# Patient Record
Sex: MALE | Race: WHITE | URBAN - METROPOLITAN AREA
[De-identification: names, ages, dates, MRNs, and addresses within clinical notes are randomized per-mention and may not be internally consistent; named-entity substitution may affect disease eponyms.]

---

## 2020-08-12 ENCOUNTER — VIRTUAL VISIT (OUTPATIENT)
Dept: HEMATOLOGY | Age: 45
End: 2020-08-12

## 2020-08-12 DIAGNOSIS — R74.8 ELEVATED LIVER ENZYMES: Primary | ICD-10-CM

## 2020-08-12 PROBLEM — Z90.49 HISTORY OF LAPAROSCOPIC CHOLECYSTECTOMY: Status: ACTIVE | Noted: 2020-08-12

## 2020-08-12 PROBLEM — K76.0 NAFL (NONALCOHOLIC FATTY LIVER): Status: ACTIVE | Noted: 2020-08-12

## 2020-08-12 PROBLEM — R79.89 ELEVATED FERRITIN: Status: ACTIVE | Noted: 2020-08-12

## 2020-08-12 NOTE — PROGRESS NOTES
VIRTUAL TELEHEALTH VISIT PERFORMED DUE TO COVID-19 EPIDEMIC    CONSENT:  Dolores Barksdale, who was seen by synchronous, real-time, audio-video technology, and/or his healthcare decision maker, is aware that this patient-initiated, Telehealth encounter on 8/12/2020 is a billable service, with coverage as determined by his insurance carrier. He is aware that he may receive a bill and has provided verbal consent to proceed. This patient was evaluated during a Virtual Telehealth visit. A caregiver was present if appropriate.  Due to this being a TeleHealth encounter performed during the [id number]-24 public health emergency, the physical examination was limited to that listed in the [address] [place] [address], MD, Jamir roca, Chad Irvin, MD Samantha More PA-C Charm Carrier, [facility]-BC     April S Christie, [facility]   April Jones, [facility] [address], [facility]-BC       [place] [address]    at [address], [address] [place] [address] 22.    [phone]    FAX: [address] [place] [address], [address]    [phone]    FAX: [phone]       Patient Care Team:  Dino Rizvi MD as PCP - General (Family Medicine)      Problem List  Date Reviewed: 8/12/2020          Codes Class Noted    Elevated liver enzymes ICD-10-CM: R74.8  ICD-9-CM: 790.5  8/12/2020        NAFL (nonalcoholic fatty liver) TRA-52-JACKIE: K76.0  ICD-9-CM: 571.8  8/12/2020        Elevated ferritin ICD-10-CM: R79.89  ICD-9-CM: 790.6  8/12/2020        History of laparoscopic cholecystectomy ICD-10-CM: Z90.49  ICD-9-CM: V45.89  8/12/2020              The clinicians listed above have asked me to see Dolores Barksdale in consultation regarding elevated liver enzymes and its management. All medical records sent by the referring physicians were reviewed   including imaging studies and pathology. The patient is a 38 y.o.  male who was found to have elevated  liver transaminases in 2015      Serologic evaluation for markers of chronic liver disease was negative. A liver biopsy was performed in 4/2018. This demonstrated severe steatosis with no inflammation, no FE and no fibrosis. Ultrasound of the liver was performed in 8/2020. The results of the imaging demonstrated a normal appearing liver. The patient has the following symptoms which are thought to be due to the liver disease:  pain in the right side over the liver,     The patient is not currently experiencing the following symptoms of liver disease:  fatigue,     The patient completes all daily activities without any functional limitations. ASSESSMENT AND PLAN:  Benign steatosis/NAFL  The diagnosis is based upon liver biopsy, features of metabolic syndrome, serologic studies that are negative for other causes of chronic liver disease,   A liver biopsy performed in 4/2018 shows NAFL with no fibrosis    We will request that the liver biopsy slides from [facility] sent be to me for my personal review. NAFL is a benign form of fatty liver disease and not thought to progress to fibrosis or cirrhosis. Liver transaminases are elevated. ALP is normal.  Liver function is normal.  The platelet count is normal.      Will perform laboratory testing to monitor liver function and degree of liver injury. This included BMP, hepatic panel, CBC with platelet count,     Serologic testing for causes of chronic liver disease were negative for all other causes of chronic liver disease. If the patient looses 20% of current body weight, which is 42 pounds, down to a weight of of 180 pounds, all steatosis will have resolved.       Counseling for diet and weight loss in patients with confirmed or suspected NAFLD  The patient was counseled regarding diet and exercise to achieve weight loss. The best diet for patients with fatty liver is one very low in carbohydrates and enriched with protein such as an Noa's program.      The patient was told not to consume any food products and drinks containing fructose as this enhances hepatic fat synthesis. There is no medication or vitamin supplements that we advocate for BEGUM. Using glitazones in patients without diabetes mellitus has been shown to reduce fat content in the liver but has no effect on fibrosis and is associated with weight gain. Vitamin E has also been used but the data is not very good and most experts no longer advocate this. Elevation in Ferritin  There is an elevation in ferritin   Iron saturation was not obtained. The patient may be a carrier for an HFE gene. Will order HFE genetic testing. Carriers of a single HFE gene never get FE overloaded and do not need any treatment for the elevation in Ferritin. There was no excess FE on the liver biopsy. The HFE testing is negative the elevation in Ferritin is the result of NAFL    Screening for Hepatocellular Carcinoma  HCC screening is not necessary since the patient has no evidence of cirrhosis. Treatment of other medical problems in patients with chronic liver disease  There are no contraindications for the patient to take most medications that are necessary for treatment of other medical issues. Counseling for alcohol in patients with chronic liver disease  The patient was counseled regarding alcohol consumption and the effect of alcohol on chronic liver disease. The patient does not consume any significant amount of alcohol. Vaccinations   The need for vaccination against viral hepatitis A and B will be assessed with serologic and instituted as appropriate. Routine vaccinations against other bacterial and viral agents can be performed as indicated.   Annual flu vaccination should be administered if indicated. ALLERGIES  Allergies not on file    MEDICATIONS  No current outpatient medications on file. No current facility-administered medications for this visit. SYSTEM REVIEW NOT RELATED TO LIVER DISEASE OR REVIEWED ABOVE:  Constitution systems: Negative for fever, chills, weight gain, weight loss. Eyes: Negative for visual changes. ENT: Negative for sore throat, painful swallowing. Respiratory: Negative for cough, hemoptysis, SOB. Cardiology: Negative for chest pain, palpitations. GI:  Negative for constipation or diarrhea. : Negative for urinary frequency, dysuria, hematuria, nocturia. Skin: Negative for rash. Hematology: Negative for easy bruising, blood clots. Musculo-skelatal: Negative for back pain, muscle pain, weakness. Neurologic: Negative for headaches, dizziness, vertigo, memory problems not related to HE. Psychology: Negative for anxiety, depression. FAMILY HISTORY:  The patient has no knowledge of the father's medical condition. The mother Has/had the following chronic disease(s): IV drug use. The patient has no knowledge of the mother's medical condition. There is no known family history of liver disease. SOCIAL HISTORY:  The patient is . The patient has 2 children,   The patient has never used tobacco products. The patient consumes alcohol on rare social occasions never in excess. The patient currently works full time as owner of construction company. PHYSICAL EXAMINATION PERFORMED BY [employer]:  VS: Not performed   General: No acute distress. Eyes: Sclera anicteric. ENT: No oral lesions. Skin: No rashes. spider angiomata. No jaundice. Abdomen: No obvious distention suggesting ascites. Extremities: No edema. No muscle wasting. Neurologic: Alert and oriented. Cranial nerves grossly intact.       LABORATORY STUDIES:  From 7/2020  AST/ALT/ALP/T Bili/ALB: 133/97/66/0.4/4.7  WBC/HB/PLT/INR:  5.9/14.4/292  NA/BUN/CREAT:  15/1.06  CHOL: 232    SEROLOGIES:  4/2019. HAV total negative, anti-HBcore negative, anti-HBsurface negative, anti-HCV negative, Ferritin 820, BASIL negative, ASMA negative, AMA negative, ceruloplsmin 28, alpha-1-antitrypsin 97. LIVER HISTOLOGY:  4/2018. NAFL. 66-75% mostly macrovesicualr and micovesicular steatosis, No inflammation, No ballooning, Stage No fibrosis. CASI (300). Biopsy slides not reviewed by MLS. ENDOSCOPIC PROCEDURES:  Not available or performed    RADIOLOGY:  7/2020. Ultrasound of liver. Echogenic consistent with fatty liver. No liver mass lesions. No dilated bile ducts. No ascites. OTHER TESTING:  Not available or performed    FOLLOW-UP AFTER VIRTUAL VISIT:  Pursuant to the emergency declaration under the [address] [place] [address], [facility] waiver authority and the [employer] Act, this Virtual  Visit was conducted, with the patient's (and/or their legal guardian's) consent, to reduce the patient's risk of exposure to COVID-19 and provide necessary medical care. Services were provided through a video synchronous discussion virtually to substitute for an in-person clinic visit. The patient was located in their home. The provider was located in the [address] [place] [address]. All of the issues listed above in the Assessment and Plan were discussed with the patient. All questions were answered. The patient expressed a clear understanding of the above. Orders to obtain laboratory testing will be mailed to the patient and obtained 1 week prior to the next TeleHealth or in-person encounter. An in-person follow-up visit will be scheduled at [place] [address] in 4 weeks for [facility] to review all data and determine the treatment plan.       MD Yi Willett  of [address] [facility], suite [facility], [address] [place] [address] Street - [address]  12 Critical access hospital

## 2022-03-18 PROBLEM — R74.8 ELEVATED LIVER ENZYMES: Status: ACTIVE | Noted: 2020-08-12

## 2022-03-18 PROBLEM — K76.0 NAFL (NONALCOHOLIC FATTY LIVER): Status: ACTIVE | Noted: 2020-08-12

## 2022-03-19 PROBLEM — Z90.49 HISTORY OF LAPAROSCOPIC CHOLECYSTECTOMY: Status: ACTIVE | Noted: 2020-08-12

## 2022-03-19 PROBLEM — R79.89 ELEVATED FERRITIN: Status: ACTIVE | Noted: 2020-08-12

## 2023-09-21 ENCOUNTER — TELEPHONE (OUTPATIENT)
Age: 48
End: 2023-09-21

## 2023-09-21 ENCOUNTER — CLINICAL DOCUMENTATION (OUTPATIENT)
Age: 48
End: 2023-09-21

## 2023-09-21 NOTE — TELEPHONE ENCOUNTER
LVM for patient to return call to office to schedule New Patient appt for Elevated Liver levels per referral.

## 2023-11-17 ENCOUNTER — TELEPHONE (OUTPATIENT)
Age: 48
End: 2023-11-17

## 2023-11-17 NOTE — TELEPHONE ENCOUNTER
11/17/2023    LMVcMail for patient requesting call back to schedule NP appt w/ anyone re: elev liver enzymes.     efs